# Patient Record
Sex: FEMALE | Race: WHITE | Employment: FULL TIME | URBAN - METROPOLITAN AREA
[De-identification: names, ages, dates, MRNs, and addresses within clinical notes are randomized per-mention and may not be internally consistent; named-entity substitution may affect disease eponyms.]

---

## 2023-10-17 PROBLEM — I73.9 PERIPHERAL VASCULAR DISEASE (CMS-HCC): Status: ACTIVE | Noted: 2023-10-17

## 2023-10-17 PROBLEM — I70.201: Status: ACTIVE | Noted: 2023-10-17

## 2023-10-17 PROBLEM — L03.116 CELLULITIS OF LEFT LEG: Status: ACTIVE | Noted: 2023-10-17

## 2023-10-17 PROBLEM — D64.9 ANEMIA: Status: ACTIVE | Noted: 2023-10-17

## 2023-10-17 PROBLEM — R63.4 WEIGHT LOSS, UNINTENTIONAL: Status: ACTIVE | Noted: 2023-10-17

## 2023-10-17 PROBLEM — L08.9 INFECTION OF TOE: Status: ACTIVE | Noted: 2023-10-17

## 2023-10-17 PROBLEM — I96: Status: ACTIVE | Noted: 2023-10-17

## 2023-10-17 RX ORDER — ALBUTEROL SULFATE 90 UG/1
AEROSOL, METERED RESPIRATORY (INHALATION)
COMMUNITY

## 2023-10-18 ENCOUNTER — OFFICE VISIT (OUTPATIENT)
Dept: VASCULAR SURGERY | Facility: CLINIC | Age: 58
End: 2023-10-18
Payer: COMMERCIAL

## 2023-10-18 VITALS
DIASTOLIC BLOOD PRESSURE: 78 MMHG | WEIGHT: 175.1 LBS | SYSTOLIC BLOOD PRESSURE: 128 MMHG | HEART RATE: 87 BPM | OXYGEN SATURATION: 91 %

## 2023-10-18 DIAGNOSIS — I73.9 PAD (PERIPHERAL ARTERY DISEASE) (CMS-HCC): ICD-10-CM

## 2023-10-18 DIAGNOSIS — Z95.828 S/P INSERTION OF ILIAC ARTERY STENT: Primary | ICD-10-CM

## 2023-10-18 PROCEDURE — 99213 OFFICE O/P EST LOW 20 MIN: CPT | Performed by: NURSE PRACTITIONER

## 2023-10-18 ASSESSMENT — LIFESTYLE VARIABLES
HOW MANY STANDARD DRINKS CONTAINING ALCOHOL DO YOU HAVE ON A TYPICAL DAY: PATIENT DOES NOT DRINK
HOW OFTEN DO YOU HAVE SIX OR MORE DRINKS ON ONE OCCASION: NEVER
HOW OFTEN DO YOU HAVE A DRINK CONTAINING ALCOHOL: NEVER
SKIP TO QUESTIONS 9-10: 1
AUDIT-C TOTAL SCORE: 0

## 2023-10-18 ASSESSMENT — PAIN SCALES - GENERAL: PAINLEVEL: 0-NO PAIN

## 2023-10-18 NOTE — PATIENT INSTRUCTIONS
Umm, it was really great to see you again!  I am glad your toe amputation site is healing well.  Please keep a close eye on the scab on the toe and keep your follow-up with Dr. Cowart.    I would like to test your arteries with an ultrasound to make sure that the stents are open and flowing well.  Prior to your test, please do not eat or drink anything for 6 hours.  We will follow-up with you with a virtual visit after that.

## 2023-10-21 NOTE — PROGRESS NOTES
History Of Present Illness  Umm Robertson is a 58 y.o. female presenting  for hospital follow-up.  The patient presented to the hospital with gangrene of the right hallux.  She underwent angiogram by Dr. Tran with placement of  kissing common iliac artery stents.  During the same hospitalization, she underwent partial amputation of the right hallux and notes that the wound is healing appropriately.  She recently had follow-up with Dr. Cowart and states that she was happy with the healing progress. She denies any symptoms of claudication, ischemic rest pain.  Currently taking aspirin, statin, clopidogrel.    Past Medical History  Asthma, tobacco abuse, and lupus.    Surgical History  , tubal ligation, adenoidectomy at age 10.     Social History  She reports that she quit smoking about 3 weeks ago. Her smoking use included cigarettes. She has never used smokeless tobacco. Drug use questions deferred to the physician. She reports that she does not drink alcohol.    Family History  Family History   Problem Relation Name Age of Onset    Hypertension Mother          Allergies  Latex, natural rubber    Review of Systems    CONSTITUTIONAL: Denies weight loss, fever and chills.    HEENT: Denies changes in vision and hearing.    RESPIRATORY: Denies SOB and cough.    CV: Denies palpitations and CP.    GI: Denies abdominal pain, nausea, vomiting and diarrhea.    : Denies dysuria and urinary frequency.    MSK: Denies myalgia and joint pain.    SKIN: Denies rash and pruritus.  Healing amputation of right great toe    VASC: Denies claudication, ischemic rest pain, or open wounds or sores    NEUROLOGICAL: Denies headache and syncope.    PSYCHIATRIC: Denies recent changes in mood. Denies anxiety and depression.     Physical Exam    General: Pt is alert and oriented x 3. Pleasant, conversive  HEENT: Head is atraumatic, normocephalic. No cervical bruits  Cardiac: Normal S1-S2.  Regular rate and rhythm.  No  murmurs.  Respiratory: Lungs clear to auscultation.  No adventitious sounds.  Abdomen: Soft, nondistended, nontender.  Bowel sounds x4 quadrants.  Pulse exam: Palpable brachial and radial pulses bilaterall.  Palpable right posterior tibial pulse.  Extremities: No significant edema noted.  Extremities are warm to the touch and normal in color.  No open wounds or sores.  Neuro: Moves all extremities spontaneously.  No focal deficits.  Psych: Appropriate affect.  Answers questions appropriately.     Last Recorded Vitals  /78 (BP Location: Left arm, Patient Position: Sitting, BP Cuff Size: Adult)   Pulse 87   Wt 79.4 kg (175 lb 1.6 oz)   SpO2 91%     Relevant Results    Current Outpatient Medications   Medication Instructions    albuterol (Proventil HFA) 90 mcg/actuation inhaler inhalation    aspirin 81 mg EC tablet TAKE 1 TABLET BY MOUTH DAILY    atorvastatin (Lipitor) 40 mg tablet TAKE 1 TABLET BY MOUTH DAILY AT BEDTIME    clopidogrel (Plavix) 75 mg tablet TAKE 1 TABLET BY MOUTH DAILY               Assessment/Plan       #1 Peripheral arterial disease-patient is status post placement of common iliac kissing stents in September 2023 by Dr. Tran.  She has not yet had a follow-up arterial study.  However clinically, she has a palpable right posterior tibial pulse and a healing right great toe amputation site.  She needs a follow-up SHARMILA and aorto iliac duplex.  Please continue clopidogrel, aspirin, statin.       WILDER Ramsay-CNP

## 2023-11-08 ENCOUNTER — TELEMEDICINE (OUTPATIENT)
Dept: VASCULAR SURGERY | Facility: CLINIC | Age: 58
End: 2023-11-08
Payer: COMMERCIAL

## 2023-11-08 DIAGNOSIS — Z95.828 S/P INSERTION OF ILIAC ARTERY STENT: Primary | ICD-10-CM

## 2023-11-08 PROCEDURE — 99024 POSTOP FOLLOW-UP VISIT: CPT | Performed by: NURSE PRACTITIONER

## 2023-12-01 NOTE — PROGRESS NOTES
History Of Present Illness  Umm Robertson is a 58 y.o. female presenting with DUPLICATE NOTE.     Past Medical History  She has no past medical history on file.    Surgical History  She has a past surgical history that includes CT angio aorta and bilateral iliofemoral runoff w and or wo IV contrast (9/18/2023).     Social History  She reports that she quit smoking about 2 months ago. Her smoking use included cigarettes. She has never used smokeless tobacco. Drug use questions deferred to the physician. She reports that she does not drink alcohol.    Family History  Family History   Problem Relation Name Age of Onset    Hypertension Mother          Allergies  Latex, natural rubber    Review of Systems     Physical Exam     Last Recorded Vitals  There were no vitals taken for this visit.    Relevant Results        DUPLICATE NOTE     Assessment/Plan       Duplicate note       Shlomo Aburto, APRN-CNP

## 2024-05-02 DIAGNOSIS — I73.9 PAD (PERIPHERAL ARTERY DISEASE) (CMS-HCC): Primary | ICD-10-CM

## 2024-05-02 RX ORDER — ATORVASTATIN CALCIUM 40 MG/1
40 TABLET, FILM COATED ORAL NIGHTLY
Qty: 30 TABLET | Refills: 6 | Status: SHIPPED | OUTPATIENT
Start: 2024-05-02 | End: 2025-05-02

## 2024-12-13 DIAGNOSIS — I73.9 PAD (PERIPHERAL ARTERY DISEASE) (CMS-HCC): ICD-10-CM

## 2024-12-13 RX ORDER — ATORVASTATIN CALCIUM 40 MG/1
40 TABLET, FILM COATED ORAL NIGHTLY
Qty: 30 TABLET | Refills: 3 | Status: SHIPPED | OUTPATIENT
Start: 2024-12-13 | End: 2024-12-13

## 2024-12-13 RX ORDER — ATORVASTATIN CALCIUM 40 MG/1
40 TABLET, FILM COATED ORAL NIGHTLY
Qty: 30 TABLET | Refills: 1 | Status: SHIPPED | OUTPATIENT
Start: 2024-12-13

## 2024-12-13 NOTE — TELEPHONE ENCOUNTER
Attempted to leave a vmail, mail box full, informing patient that I refilled her atorvastatin 40 mg at [facility].     She had outside CMP on 10/23 with normal AST/ALT.     She needs follow-up SHARMIAL and Aorta iliac duplex imaging, per her last visit with Pj Aburto NP on 10/18.  Those orders are already in but not schedule.         Will send [employer] message also.     Velma Kumari, APRN-CNP  Vascular Surgery

## 2025-01-29 ENCOUNTER — TELEPHONE (OUTPATIENT)
Dept: RHEUMATOLOGY | Facility: CLINIC | Age: 60
End: 2025-01-29
Payer: COMMERCIAL

## 2025-02-17 ENCOUNTER — OFFICE VISIT (OUTPATIENT)
Dept: WOUND CARE | Facility: HOSPITAL | Age: 60
End: 2025-02-17
Payer: COMMERCIAL

## 2025-02-17 DIAGNOSIS — L03.90 WOUND CELLULITIS: ICD-10-CM

## 2025-02-17 PROCEDURE — 99213 OFFICE O/P EST LOW 20 MIN: CPT | Mod: 25

## 2025-02-17 PROCEDURE — 11042 DBRDMT SUBQ TIS 1ST 20SQCM/<: CPT

## 2025-02-23 LAB
BACTERIA SPEC AEROBE CULT: ABNORMAL
BACTERIA SPEC ANAEROBE CULT: ABNORMAL

## 2025-02-24 ENCOUNTER — APPOINTMENT (OUTPATIENT)
Dept: WOUND CARE | Facility: HOSPITAL | Age: 60
End: 2025-02-24
Payer: COMMERCIAL

## 2025-03-03 ENCOUNTER — OFFICE VISIT (OUTPATIENT)
Dept: WOUND CARE | Facility: HOSPITAL | Age: 60
End: 2025-03-03
Payer: COMMERCIAL

## 2025-03-03 PROCEDURE — 99212 OFFICE O/P EST SF 10 MIN: CPT

## 2025-03-17 ENCOUNTER — APPOINTMENT (OUTPATIENT)
Dept: WOUND CARE | Facility: HOSPITAL | Age: 60
End: 2025-03-17
Payer: COMMERCIAL